# Patient Record
Sex: FEMALE | Race: BLACK OR AFRICAN AMERICAN | NOT HISPANIC OR LATINO | Employment: FULL TIME | URBAN - METROPOLITAN AREA
[De-identification: names, ages, dates, MRNs, and addresses within clinical notes are randomized per-mention and may not be internally consistent; named-entity substitution may affect disease eponyms.]

---

## 2017-03-31 DIAGNOSIS — E11.9 TYPE 2 DIABETES MELLITUS WITHOUT COMPLICATION: ICD-10-CM

## 2017-05-31 ENCOUNTER — PATIENT OUTREACH (OUTPATIENT)
Dept: ADMINISTRATIVE | Facility: HOSPITAL | Age: 32
End: 2017-05-31

## 2017-05-31 NOTE — PROGRESS NOTES
I have attempted without success to contact this patient to schedule an appointment, left voicemail.

## 2017-07-26 ENCOUNTER — PATIENT OUTREACH (OUTPATIENT)
Dept: ADMINISTRATIVE | Facility: HOSPITAL | Age: 32
End: 2017-07-26

## 2017-08-04 ENCOUNTER — PATIENT MESSAGE (OUTPATIENT)
Dept: INTERNAL MEDICINE | Facility: CLINIC | Age: 32
End: 2017-08-04

## 2017-09-19 DIAGNOSIS — E11.9 TYPE 2 DIABETES MELLITUS WITHOUT COMPLICATION, WITH LONG-TERM CURRENT USE OF INSULIN: Chronic | ICD-10-CM

## 2017-09-19 DIAGNOSIS — Z79.4 TYPE 2 DIABETES MELLITUS WITHOUT COMPLICATION, WITH LONG-TERM CURRENT USE OF INSULIN: Chronic | ICD-10-CM

## 2017-09-20 RX ORDER — PEN NEEDLE, DIABETIC 29 G X1/2"
40 NEEDLE, DISPOSABLE MISCELLANEOUS DAILY
Refills: 1 | OUTPATIENT
Start: 2017-09-20

## 2017-10-17 DIAGNOSIS — E11.9 TYPE 2 DIABETES MELLITUS WITHOUT COMPLICATION, WITH LONG-TERM CURRENT USE OF INSULIN: Chronic | ICD-10-CM

## 2017-10-17 DIAGNOSIS — Z79.4 TYPE 2 DIABETES MELLITUS WITHOUT COMPLICATION, WITH LONG-TERM CURRENT USE OF INSULIN: Chronic | ICD-10-CM

## 2017-10-17 RX ORDER — PEN NEEDLE, DIABETIC 29 G X1/2"
40 NEEDLE, DISPOSABLE MISCELLANEOUS DAILY
Refills: 1 | OUTPATIENT
Start: 2017-10-17

## 2018-03-20 ENCOUNTER — PATIENT OUTREACH (OUTPATIENT)
Dept: ADMINISTRATIVE | Facility: HOSPITAL | Age: 33
End: 2018-03-20

## 2018-04-24 ENCOUNTER — PATIENT OUTREACH (OUTPATIENT)
Dept: ADMINISTRATIVE | Facility: HOSPITAL | Age: 33
End: 2018-04-24

## 2018-04-24 NOTE — PROGRESS NOTES
Attempted to schedule diabetic eye exam, hypertension follow up, and other health maintenance. Patient not available, left voicemail.

## 2018-09-25 ENCOUNTER — HOSPITAL ENCOUNTER (EMERGENCY)
Facility: HOSPITAL | Age: 33
Discharge: HOME | End: 2018-09-25
Attending: EMERGENCY MEDICINE
Payer: COMMERCIAL

## 2018-09-25 VITALS
SYSTOLIC BLOOD PRESSURE: 140 MMHG | TEMPERATURE: 99 F | HEART RATE: 80 BPM | OXYGEN SATURATION: 98 % | HEIGHT: 68 IN | DIASTOLIC BLOOD PRESSURE: 95 MMHG | BODY MASS INDEX: 44.41 KG/M2 | RESPIRATION RATE: 18 BRPM | WEIGHT: 293 LBS

## 2018-09-25 DIAGNOSIS — L02.415 ABSCESS OF RIGHT THIGH: Primary | ICD-10-CM

## 2018-09-25 PROCEDURE — 25000003 PHARM REV CODE 250: Performed by: PHYSICIAN ASSISTANT

## 2018-09-25 PROCEDURE — 10061 I&D ABSCESS COMP/MULTIPLE: CPT

## 2018-09-25 PROCEDURE — 99283 EMERGENCY DEPT VISIT LOW MDM: CPT | Mod: 25

## 2018-09-25 RX ORDER — SULFAMETHOXAZOLE AND TRIMETHOPRIM 800; 160 MG/1; MG/1
1 TABLET ORAL
Status: COMPLETED | OUTPATIENT
Start: 2018-09-25 | End: 2018-09-25

## 2018-09-25 RX ORDER — LIDOCAINE HYDROCHLORIDE 10 MG/ML
10 INJECTION, SOLUTION EPIDURAL; INFILTRATION; INTRACAUDAL; PERINEURAL
Status: COMPLETED | OUTPATIENT
Start: 2018-09-25 | End: 2018-09-25

## 2018-09-25 RX ORDER — SULFAMETHOXAZOLE AND TRIMETHOPRIM 800; 160 MG/1; MG/1
1 TABLET ORAL EVERY 12 HOURS
Qty: 20 TABLET | Refills: 0 | Status: SHIPPED | OUTPATIENT
Start: 2018-09-25 | End: 2018-10-05

## 2018-09-25 RX ADMIN — SULFAMETHOXAZOLE AND TRIMETHOPRIM 1 TABLET: 800; 160 TABLET ORAL at 10:09

## 2018-09-25 RX ADMIN — LIDOCAINE HYDROCHLORIDE 100 MG: 10 INJECTION, SOLUTION EPIDURAL; INFILTRATION; INTRACAUDAL; PERINEURAL at 10:09

## 2018-09-26 NOTE — ED PROVIDER NOTES
SCRIBE #1 NOTE: I, Corinne Mack, am scribing for, and in the presence of, Geovani Mendoza PA-C. I have scribed the HPI, ROS, and PEx.     SCRIBE #2 NOTE: I, Beba Trivedi, am scribing for, and in the presence of,  Geovani Mendoza PA-C. I have scribed the remaining portions of the note not scribed by Scribe #1.     History      Chief Complaint   Patient presents with    Abscess     abscess to right inner thigh x 3 weeks       Review of patient's allergies indicates:   Allergen Reactions    No known drug allergies         HPI   HPI    9/25/2018, 9:34 PM   History obtained from the patient      History of Present Illness: Antoinette Jennings is a 33 y.o. female patient with PMHx of DM (blood sugar has been under control) and HTN who presents to the Emergency Department for abscess to the R inner thigh which onset gradually 2-3 weeks ago. Pt reports using hot compresses and attempting to express the pus from the abscess herself, but states that while she did express some pus she is still having pain and swelling to the area. Symptoms are constant and mild in severity. Palpation worsens the pt's pain. No mitigating factors reported. No associated sxs reported. Patient denies any fever, chills, fatigue, N/V, myalgias, rash, wounds, HA, dizziness, and all other sxs at this time. No other prior Tx reported. No further complaints or concerns at this time.         Arrival mode: Personal vehicle    PCP: Leona Phan MD       Past Medical History:  Past Medical History:   Diagnosis Date    Amblyopia     left eye     Diabetes mellitus     2008  last week    Diabetes mellitus type II     HTN (hypertension)     Hyperlipidemia        Past Surgical History:  History reviewed. No pertinent surgical history.      Family History:  Family History   Problem Relation Age of Onset    Cataracts Mother     Hypertension Mother     Diabetes Mother     Kidney disease Mother     Hypertension Father      Kidney disease Father     Diabetes Maternal Aunt     Diabetes Maternal Uncle     Breast cancer Neg Hx     Colon cancer Neg Hx     Ovarian cancer Neg Hx        Social History:  Social History     Tobacco Use    Smoking status: Never Smoker    Smokeless tobacco: Never Used   Substance and Sexual Activity    Alcohol use: No    Drug use: No    Sexual activity: Unknown       ROS   Review of Systems   Constitutional: Negative for chills, fatigue and fever.   Respiratory: Negative for shortness of breath and wheezing.    Cardiovascular: Negative for palpitations and leg swelling.   Gastrointestinal: Negative for abdominal pain, nausea and vomiting.   Musculoskeletal: Negative for arthralgias, back pain and myalgias.   Skin: Negative for rash and wound.        (+) abscess to R inner thigh   Neurological: Negative for dizziness, light-headedness and headaches.   All other systems reviewed and are negative.    Physical Exam      Initial Vitals [09/25/18 2110]   BP Pulse Resp Temp SpO2   (!) 144/96 82 18 98.6 °F (37 °C) 97 %      MAP       --          Physical Exam  Nursing Notes and Vital Signs Reviewed.  Constitutional: Patient is in mild distress. Well-developed and well-nourished.  Head: Atraumatic.   Eyes: Conjunctivae are not pale.   Cardiovascular: Regular rate. Distal pulses are 2+ and symmetric.  Pulmonary/Chest: No respiratory distress. No cough.  Abdominal: Soft. Non-distended. Obese.  Musculoskeletal: Moves all extremities. No obvious deformities.   Skin: 2 cm superficial cutaneous erythematous, tender to palpation, abscess to the medial aspect of the R mid thigh with no signs of cellulitis.  Neurological:  Alert, awake, and appropriate. Normal speech. Normal gait. No acute focal neurological deficits are appreciated.  Psychiatric: Normal affect.     ED Course    I & D - Incision and Drainage  Date/Time: 9/25/2018 10:36 PM  Performed by: Geovani Mendoza PA-C  Authorized by: Geovani Mendoza PA-C  "  Consent Done: Yes  Consent: Verbal consent obtained.  Consent given by: patient  Patient understanding: patient states understanding of the procedure being performed  Patient consent: the patient's understanding of the procedure matches consent given  Procedure consent: procedure consent matches procedure scheduled  Relevant documents: relevant documents present and verified  Patient identity confirmed:  and name  Type: abscess  Body area: lower extremity (R inner thigh)  Anesthesia: local infiltration    Anesthesia:  Local Anesthetic: lidocaine 1% without epinephrine  Patient sedated: no  Scalpel size: 11  Complexity: complex  Drainage: purulent  Drainage amount: moderate  Wound treatment: incision,  drainage and  wound packed  Packing material: / in gauze  Complications: No  Patient tolerance: Patient tolerated the procedure well with no immediate complications        ED Vital Signs:  Vitals:    18 2110   BP: (!) 144/96   Pulse: 82   Resp: 18   Temp: 98.6 °F (37 °C)   TempSrc: Oral   SpO2: 97%   Weight: (!) 152 kg (335 lb)   Height: 5' 8" (1.727 m)              The Emergency Provider reviewed the vital signs and test results, which are outlined above.    ED Discussion     10:47 PM: Reassessed pt at this time.  Pt states her condition has improved at this time. Discussed with pt all pertinent ED information and results. Discussed pt dx and plan of tx. Gave pt all f/u and return to the ED instructions. All questions and concerns were addressed at this time. Pt expresses understanding of information and instructions, and is comfortable with plan to discharge. Pt is stable for discharge.    I discussed wound care precautions with patient and/or family/caretaker; specifically that all wounds have risk of infection despite efforts to cleanse and debride the wound; and there is a risk of an occult foreign body (and thus increased risk of infection) despite a negative examination.  I discussed with patient " need to return for any signs of infection, specifically redness, increased pain, fever, drainage of pus, or any concern, immediately.      ED Medication(s):  Medications   lidocaine (PF) 10 mg/ml (1%) injection 100 mg (100 mg Infiltration Given by Other 9/25/18 2206)   sulfamethoxazole-trimethoprim 800-160mg per tablet 1 tablet (1 tablet Oral Given 9/25/18 2205)          Medication List      START taking these medications    sulfamethoxazole-trimethoprim 800-160mg 800-160 mg Tab  Commonly known as:  BACTRIM DS  Take 1 tablet by mouth every 12 (twelve) hours. for 10 days        ASK your doctor about these medications    BLOOD GLUCOSE TEST Strp  Generic drug:  blood sugar diagnostic     insulin detemir U-100 100 unit/mL (3 mL) Inpn pen  Commonly known as:  LEVEMIR FLEXTOUCH U-100 INSULN  Inject 40 Units into the skin once daily.     insulin syringe-needle U-100 0.5 mL 31 gauge x 5/16 Syrg  40 Units by Misc.(Non-Drug; Combo Route) route once daily.     lancets 31 gauge Misc     metFORMIN 1000 MG tablet  Commonly known as:  GLUCOPHAGE  Take 1 tablet (1,000 mg total) by mouth 2 (two) times daily with meals. 1/2 tablet twice daily     TRULICITY 0.75 mg/0.5 mL Pnij  Generic drug:  dulaglutide  INJECT 0.75 MG INTO THE SKIN ONCE A WEEK           Where to Get Your Medications      You can get these medications from any pharmacy    Bring a paper prescription for each of these medications  · sulfamethoxazole-trimethoprim 800-160mg 800-160 mg Tab         Follow-up Information     Leona Phan MD. Schedule an appointment as soon as possible for a visit in 1 day.    Specialty:  Family Medicine  Why:  For wound re-check and packing removal.  Contact information:  [address] [place] [address] [phone]             [facility] - .    Specialty:  Emergency Medicine  Why:  If symptoms worsen in any way.  Contact information:  [address]  [facility]  [address]  [phone]                   Medical Decision Making              Scribe Attestation:   Scribe #1: I performed the above scribed service and the documentation accurately describes the services I performed. I attest to the accuracy of the note.    Attending:   Physician Attestation Statement for Scribe #1: I, Geovani Mendoza PA-C, personally performed the services described in this documentation, as scribed by Corinne Mack, in my presence, and it is both accurate and complete.       Scribe Attestation:   Scribe #2: I performed the above scribed service and the documentation accurately describes the services I performed. I attest to the accuracy of the note.    Attending Attestation:           Physician Attestation for Scribe:    Physician Attestation Statement for Scribe #2: I, Geovani Mendoza PA-C, reviewed documentation, as scribed by Beba Trivedi in my presence, and it is both accurate and complete. I also acknowledge and confirm the content of the note done by Mikeibnaun #1.          Clinical Impression       ICD-10-CM ICD-9-CM   1. Abscess of right thigh L02.415 682.6       Disposition:   Disposition: Discharged  Condition: Stable           Geovani Mendoza PA-C  09/25/18 2253